# Patient Record
Sex: MALE | Race: BLACK OR AFRICAN AMERICAN | Employment: FULL TIME | URBAN - METROPOLITAN AREA
[De-identification: names, ages, dates, MRNs, and addresses within clinical notes are randomized per-mention and may not be internally consistent; named-entity substitution may affect disease eponyms.]

---
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
## 2021-04-10 ENCOUNTER — HOSPITAL ENCOUNTER (EMERGENCY)
Age: 21
Discharge: HOME | End: 2021-04-10
Attending: EMERGENCY MEDICINE

## 2021-04-10 VITALS
OXYGEN SATURATION: 100 % | SYSTOLIC BLOOD PRESSURE: 129 MMHG | TEMPERATURE: 98.8 F | DIASTOLIC BLOOD PRESSURE: 78 MMHG | RESPIRATION RATE: 16 BRPM | HEART RATE: 100 BPM

## 2021-04-10 DIAGNOSIS — J03.90 ACUTE TONSILLITIS, UNSPECIFIED ETIOLOGY: Primary | ICD-10-CM

## 2021-04-10 LAB — STREP,MOLECULAR STRPM: NEGATIVE

## 2021-04-10 PROCEDURE — 99283 EMERGENCY DEPT VISIT LOW MDM: CPT

## 2021-04-10 PROCEDURE — 87651 STREP A DNA AMP PROBE: CPT

## 2021-04-10 PROCEDURE — 74011250637 HC RX REV CODE- 250/637: Performed by: NURSE PRACTITIONER

## 2021-04-10 RX ORDER — DEXAMETHASONE SODIUM PHOSPHATE 100 MG/10ML
10 INJECTION INTRAMUSCULAR; INTRAVENOUS
Status: COMPLETED | OUTPATIENT
Start: 2021-04-10 | End: 2021-04-10

## 2021-04-10 RX ORDER — AMOXICILLIN 500 MG/1
500 TABLET, FILM COATED ORAL 2 TIMES DAILY
Qty: 20 TAB | Refills: 0 | Status: SHIPPED | OUTPATIENT
Start: 2021-04-10 | End: 2021-12-28

## 2021-04-10 RX ADMIN — DEXAMETHASONE SODIUM PHOSPHATE 10 MG: 10 INJECTION INTRAMUSCULAR; INTRAVENOUS at 17:54

## 2021-04-10 NOTE — ED TRIAGE NOTES
Pt ambulatory to triage. Pt states swelling to tonsils bilaterally. Pt has swelling, redness, and exudate noted to bilateral tonsils. No fever. No antipyretics in 24 hours. Strep swab not obtained- no swabs available at this time.

## 2021-04-10 NOTE — ED PROVIDER NOTES
Patient presents with sore throat and swelling to his tonsils for the past 3-4 days. He denies fever. The history is provided by the patient. Sore Throat   This is a new problem. The current episode started more than 2 days ago. The problem has been gradually worsening. There has been no fever. Associated symptoms include swollen glands. Pertinent negatives include no diarrhea, no vomiting, no congestion, no drooling, no ear discharge, no ear pain, no headaches, no plugged ear sensation, no shortness of breath, no stridor, no trouble swallowing, no stiff neck and no cough. No past medical history on file. No past surgical history on file. No family history on file.     Social History     Socioeconomic History    Marital status: SINGLE     Spouse name: Not on file    Number of children: Not on file    Years of education: Not on file    Highest education level: Not on file   Occupational History    Not on file   Social Needs    Financial resource strain: Not on file    Food insecurity     Worry: Not on file     Inability: Not on file    Transportation needs     Medical: Not on file     Non-medical: Not on file   Tobacco Use    Smoking status: Former Smoker     Packs/day: 0.25     Quit date: 2020     Years since quittin.6    Smokeless tobacco: Never Used    Tobacco comment: Patient stopped smoking 4 days ago   Substance and Sexual Activity    Alcohol use: Not Currently    Drug use: Never    Sexual activity: Not on file   Lifestyle    Physical activity     Days per week: Not on file     Minutes per session: Not on file    Stress: Not on file   Relationships    Social connections     Talks on phone: Not on file     Gets together: Not on file     Attends Alevism service: Not on file     Active member of club or organization: Not on file     Attends meetings of clubs or organizations: Not on file     Relationship status: Not on file    Intimate partner violence     Fear of current or ex partner: Not on file     Emotionally abused: Not on file     Physically abused: Not on file     Forced sexual activity: Not on file   Other Topics Concern    Not on file   Social History Narrative    Not on file         ALLERGIES: Patient has no known allergies. Review of Systems   Constitutional: Negative for fever. HENT: Positive for sore throat. Negative for congestion, drooling, ear discharge, ear pain and trouble swallowing. Respiratory: Negative for cough, shortness of breath and stridor. Gastrointestinal: Negative for diarrhea and vomiting. Neurological: Negative for headaches. Vitals:    04/10/21 1702   BP: 135/74   Pulse: 100   Resp: 16   Temp: 98.8 °F (37.1 °C)   SpO2: 98%            Physical Exam  Vitals signs and nursing note reviewed. Constitutional:       Appearance: He is well-developed. HENT:      Head: Normocephalic and atraumatic. Mouth/Throat:      Mouth: Mucous membranes are moist.      Pharynx: Pharyngeal swelling, oropharyngeal exudate and posterior oropharyngeal erythema present. Tonsils: Tonsillar exudate present. No tonsillar abscesses. Eyes:      Pupils: Pupils are equal, round, and reactive to light. Neck:      Musculoskeletal: Normal range of motion. Cardiovascular:      Rate and Rhythm: Normal rate. Pulses: Normal pulses. Pulmonary:      Effort: Pulmonary effort is normal.      Breath sounds: Normal breath sounds. Abdominal:      General: Abdomen is flat. There is no distension. Lymphadenopathy:      Head:      Right side of head: Tonsillar adenopathy present. Left side of head: Tonsillar adenopathy present. Cervical: Cervical adenopathy present. Skin:     General: Skin is warm and dry. Neurological:      General: No focal deficit present. Mental Status: He is alert and oriented to person, place, and time.    Psychiatric:         Mood and Affect: Mood normal.         Behavior: Behavior normal. Recent Results (from the past 12 hour(s))   STREP, GROUP A, LARON    Collection Time: 04/10/21  5:21 PM    Specimen: Swab   Result Value Ref Range    Strep, Molecular Negative         MDM  Number of Diagnoses or Management Options  Acute tonsillitis, unspecified etiology  Diagnosis management comments: Negative strep. Will go ahead and treat with antibiotics while culture is pending. DDX: Strep pharyngitis, PTA, viral pharyngitis.         Amount and/or Complexity of Data Reviewed  Clinical lab tests: ordered and reviewed  Tests in the medicine section of CPT®: ordered    Patient Progress  Patient progress: stable         Procedures

## 2021-04-10 NOTE — DISCHARGE INSTRUCTIONS
Amoxicillin as prescribed. You will need to change your toothbrush in 24 hours to prevent reinfection. Warm salt water gargles will help with pain. Follow up with your primary care provider for a recheck if symptoms worsen or fail to improve. Return to the emergency department as needed.

## 2021-04-10 NOTE — ED NOTES
I have reviewed discharge instructions with the patient. The patient verbalized understanding. Patient left ED via Discharge Method: ambulatory to Home with self  Opportunity for questions and clarification provided. Patient given 1 scripts. To continue your aftercare when you leave the hospital, you may receive an automated call from our care team to check in on how you are doing. This is a free service and part of our promise to provide the best care and service to meet your aftercare needs.  If you have questions, or wish to unsubscribe from this service please call [phone]. Thank you for Choosing our [facility] Emergency Department.

## 2022-01-05 ENCOUNTER — HOSPITAL ENCOUNTER (EMERGENCY)
Age: 22
Discharge: HOME | End: 2022-01-05
Attending: EMERGENCY MEDICINE

## 2022-01-05 VITALS
HEART RATE: 97 BPM | RESPIRATION RATE: 20 BRPM | HEIGHT: 73 IN | TEMPERATURE: 98.1 F | DIASTOLIC BLOOD PRESSURE: 100 MMHG | SYSTOLIC BLOOD PRESSURE: 157 MMHG | BODY MASS INDEX: 20.15 KG/M2 | OXYGEN SATURATION: 100 % | WEIGHT: 152 LBS

## 2022-01-05 DIAGNOSIS — B00.1 COLD SORE: Primary | ICD-10-CM

## 2022-01-05 DIAGNOSIS — U07.1 COVID-19: ICD-10-CM

## 2022-01-05 PROCEDURE — 86695 HERPES SIMPLEX TYPE 1 TEST: CPT

## 2022-01-05 PROCEDURE — 99281 EMR DPT VST MAYX REQ PHY/QHP: CPT

## 2022-01-05 RX ORDER — ACYCLOVIR 400 MG/1
400 TABLET ORAL 4 TIMES DAILY
Qty: 28 TABLET | Refills: 0 | Status: SHIPPED | OUTPATIENT
Start: 2022-01-05 | End: 2022-01-12

## 2022-01-05 NOTE — DISCHARGE INSTRUCTIONS
Use meds as directed, check my chart for test results will be called with any positive results.   Return to ER symptoms worsen

## 2022-01-05 NOTE — ED PROVIDER NOTES
Patient states he was diagnosed with Covid last week. Patient started to have tender rash to the roof of mouth and upper lip 2 days ago. No difficulty swallowing. Patient states he has some mild shortness of breath no cough or fever. Patient denies immunocompromise i.e. HIV, rheumatoid, cancer    The history is provided by the patient. Mouth Lesions  This is a new problem. The current episode started 2 days ago. The problem occurs constantly. The problem has been gradually worsening. Associated symptoms include shortness of breath. Pertinent negatives include no chest pain. Nothing aggravates the symptoms. Nothing relieves the symptoms. He has tried nothing for the symptoms. The treatment provided no relief. No past medical history on file. No past surgical history on file. No family history on file. Social History     Socioeconomic History    Marital status: SINGLE     Spouse name: Not on file    Number of children: Not on file    Years of education: Not on file    Highest education level: Not on file   Occupational History    Not on file   Tobacco Use    Smoking status: Former Smoker     Packs/day: 0.25     Quit date: 2020     Years since quittin.3    Smokeless tobacco: Never Used    Tobacco comment: Patient stopped smoking 4 days ago   Substance and Sexual Activity    Alcohol use: Not Currently    Drug use: Never    Sexual activity: Not on file   Other Topics Concern    Not on file   Social History Narrative    Not on file     Social Determinants of Health     Financial Resource Strain:     Difficulty of Paying Living Expenses: Not on file   Food Insecurity:     Worried About [address] in the Last Year: Not on file    [place] of Food in the Last Year: Not on file   Transportation Needs:     Lack of Transportation (Medical): Not on file    Lack of Transportation (Non-Medical):  Not on file   Physical Activity:     Days of Exercise per Week: Not on file  Minutes of Exercise per Session: Not on file   Stress:     Feeling of Stress : Not on file   Social Connections:     Frequency of Communication with Friends and Family: Not on file    Frequency of Social Gatherings with Friends and Family: Not on file    Attends [employer] Services: Not on file    Active Member of Clubs or Organizations: Not on file    Attends Club or Organization Meetings: Not on file    Marital Status: Not on file   Intimate Partner Violence:     Fear of Current or Ex-Partner: Not on file    Emotionally Abused: Not on file    Physically Abused: Not on file    Sexually Abused: Not on file   Housing Stability:     Unable to Pay for Housing in the Last Year: Not on file    Number of [place] in the Last Year: Not on file    Unstable Housing in the Last Year: Not on file         ALLERGIES: Patient has no known allergies. Review of Systems   Respiratory: Positive for shortness of breath. Cardiovascular: Negative for chest pain. All other systems reviewed and are negative. Vitals:    01/05/22 1217   BP: (!) 157/100   Pulse: 97   Resp: 20   Temp: 98.1 °F (36.7 °C)   SpO2: 100%   Weight: 68.9 kg (152 lb)   Height: 6' 1\" (1.854 m)            Physical Exam  Vitals and nursing note reviewed. Constitutional:       General: He is not in acute distress. Appearance: Normal appearance. He is well-developed and normal weight. He is not diaphoretic. HENT:      Head: Normocephalic and atraumatic. Right Ear: Tympanic membrane and external ear normal.      Left Ear: Tympanic membrane and external ear normal.      Nose: Nose normal.      Mouth/Throat:      Comments: Inner upper lip with small area of grouped crusted lesions tender to palpation. Roof of the mouth with similar small area of grouped lesions, posterior pharynx clear   Eyes:      Pupils: Pupils are equal, round, and reactive to light. Cardiovascular:      Rate and Rhythm: Normal rate and regular rhythm. Pulmonary:      Effort: Pulmonary effort is normal.      Breath sounds: Normal breath sounds. No wheezing or rhonchi. Chest:      Chest wall: No tenderness. Abdominal:      General: Bowel sounds are normal.      Palpations: Abdomen is soft. Musculoskeletal:         General: Normal range of motion. Cervical back: Normal range of motion and neck supple. Skin:     General: Skin is warm. Neurological:      General: No focal deficit present. Mental Status: He is alert and oriented to person, place, and time.    Psychiatric:         Mood and Affect: Mood normal.         Behavior: Behavior normal.          MDM  Number of Diagnoses or Management Options  Diagnosis management comments: Possible cold sore to mouth, will check hsv with blood sample  Will give rx for acyclovir   Pt still out of work due to covid        Amount and/or Complexity of Data Reviewed  Clinical lab tests: ordered  Review and summarize past medical records: yes    Risk of Complications, Morbidity, and/or Mortality  Presenting problems: low  Diagnostic procedures: low  Management options: low    Patient Progress  Patient progress: improved         Procedures

## 2022-01-06 LAB
HSV1 IGG SER IA-ACNC: 30.8 INDEX (ref 0–0.9)
HSV2 IGG SER IA-ACNC: <0.91 INDEX (ref 0–0.9)

## 2022-01-07 NOTE — PROGRESS NOTES
ED pharmacist successfully contacted Valerie Lyons on 01/07/22 regarding the recent results of their STI culture(s). The patient has been appropriately treated in the emergency department prior to discharge for the identified infection. The patient received a prescription for acyclovir upon discharge. Based on culture results, the patient is being adequately treated with existing antimicrobial therapy. The patient has been instructed to inform all anal, vaginal, and/or oral sex partners of the past 80 days since symptom onset of positive STI result(s). Valerie Lyons has also been instructed to abstain from sexual encounters until after receiving treatment and symptoms have resolved. Patient has been educated to practice safe sex by using a condom during any sexual encounters. The patient has been advised to follow-up with their primary care provider or return to the ED if symptoms do not resolve or worsen. Allergies as of 01/05/2022    (No Known Allergies)     Iris Carreon, PharmD.   Emergency Medicine Clinical Pharmacist

## 2022-07-07 ENCOUNTER — HOSPITAL ENCOUNTER (EMERGENCY)
Age: 22
Discharge: HOME | End: 2022-07-07
Attending: STUDENT IN AN ORGANIZED HEALTH CARE EDUCATION/TRAINING PROGRAM

## 2022-07-07 ENCOUNTER — APPOINTMENT (OUTPATIENT)
Dept: GENERAL RADIOLOGY | Age: 22
End: 2022-07-07

## 2022-07-07 VITALS
TEMPERATURE: 98.3 F | HEART RATE: 57 BPM | DIASTOLIC BLOOD PRESSURE: 79 MMHG | OXYGEN SATURATION: 99 % | WEIGHT: 156 LBS | BODY MASS INDEX: 20.67 KG/M2 | HEIGHT: 73 IN | SYSTOLIC BLOOD PRESSURE: 114 MMHG | RESPIRATION RATE: 17 BRPM

## 2022-07-07 DIAGNOSIS — K59.00 CONSTIPATION, UNSPECIFIED CONSTIPATION TYPE: Primary | ICD-10-CM

## 2022-07-07 LAB
ALBUMIN SERPL-MCNC: 4.1 G/DL (ref 3.5–5)
ALBUMIN/GLOB SERPL: 1.2 {RATIO} (ref 1.2–3.5)
ALP SERPL-CCNC: 81 U/L (ref 50–136)
ALT SERPL-CCNC: 26 U/L (ref 12–65)
ANION GAP SERPL CALC-SCNC: 2 MMOL/L (ref 7–16)
AST SERPL-CCNC: 19 U/L (ref 15–37)
BASOPHILS # BLD: 0.1 K/UL (ref 0–0.2)
BASOPHILS NFR BLD: 1 % (ref 0–2)
BILIRUB SERPL-MCNC: 1 MG/DL (ref 0.2–1.1)
BILIRUB UR QL: NEGATIVE
BUN SERPL-MCNC: 13 MG/DL (ref 6–23)
CALCIUM SERPL-MCNC: 8.9 MG/DL (ref 8.3–10.4)
CHLORIDE SERPL-SCNC: 106 MMOL/L (ref 98–107)
CO2 SERPL-SCNC: 30 MMOL/L (ref 21–32)
CREAT SERPL-MCNC: 1 MG/DL (ref 0.8–1.5)
DIFFERENTIAL METHOD BLD: ABNORMAL
EKG ATRIAL RATE: 66 BPM
EKG DIAGNOSIS: NORMAL
EKG P AXIS: 58 DEGREES
EKG P-R INTERVAL: 155 MS
EKG Q-T INTERVAL: 376 MS
EKG QRS DURATION: 97 MS
EKG QTC CALCULATION (BAZETT): 391 MS
EKG R AXIS: 92 DEGREES
EKG T AXIS: 66 DEGREES
EKG VENTRICULAR RATE: 65 BPM
EOSINOPHIL # BLD: 0.3 K/UL (ref 0–0.8)
EOSINOPHIL NFR BLD: 3 % (ref 0.5–7.8)
ERYTHROCYTE [DISTWIDTH] IN BLOOD BY AUTOMATED COUNT: 11.5 % (ref 11.9–14.6)
GLOBULIN SER CALC-MCNC: 3.4 G/DL (ref 2.3–3.5)
GLUCOSE SERPL-MCNC: 93 MG/DL (ref 65–100)
GLUCOSE UR QL STRIP.AUTO: NEGATIVE MG/DL
HCT VFR BLD AUTO: 46.7 % (ref 41.1–50.3)
HGB BLD-MCNC: 15 G/DL (ref 13.6–17.2)
IMM GRANULOCYTES # BLD AUTO: 0 K/UL (ref 0–0.5)
IMM GRANULOCYTES NFR BLD AUTO: 0 % (ref 0–5)
KETONES UR-MCNC: NEGATIVE MG/DL
LEUKOCYTE ESTERASE UR QL STRIP: NEGATIVE
LIPASE SERPL-CCNC: 58 U/L (ref 73–393)
LYMPHOCYTES # BLD: 1.9 K/UL (ref 0.5–4.6)
LYMPHOCYTES NFR BLD: 21 % (ref 13–44)
MCH RBC QN AUTO: 29.2 PG (ref 26.1–32.9)
MCHC RBC AUTO-ENTMCNC: 32.1 G/DL (ref 31.4–35)
MCV RBC AUTO: 90.9 FL (ref 79.6–97.8)
MONOCYTES # BLD: 0.8 K/UL (ref 0.1–1.3)
MONOCYTES NFR BLD: 9 % (ref 4–12)
NEUTS SEG # BLD: 5.9 K/UL (ref 1.7–8.2)
NEUTS SEG NFR BLD: 66 % (ref 43–78)
NITRITE UR QL: NEGATIVE
NRBC # BLD: 0 K/UL (ref 0–0.2)
PH UR: 7 [PH] (ref 5–9)
PLATELET # BLD AUTO: 256 K/UL (ref 150–450)
PMV BLD AUTO: 9.7 FL (ref 9.4–12.3)
POTASSIUM SERPL-SCNC: 3.9 MMOL/L (ref 3.5–5.1)
PROT SERPL-MCNC: 7.5 G/DL (ref 6.3–8.2)
PROT UR QL: NEGATIVE MG/DL
RBC # BLD AUTO: 5.14 M/UL (ref 4.23–5.6)
RBC # UR STRIP: NEGATIVE /UL
SERVICE CMNT-IMP: ABNORMAL
SODIUM SERPL-SCNC: 138 MMOL/L (ref 138–145)
SP GR UR: 1.02 (ref 1–1.02)
UROBILINOGEN UR QL: 1 EU/DL (ref 0.2–1)
WBC # BLD AUTO: 8.9 K/UL (ref 4.3–11.1)

## 2022-07-07 PROCEDURE — 74022 RADEX COMPL AQT ABD SERIES: CPT

## 2022-07-07 PROCEDURE — 80053 COMPREHEN METABOLIC PANEL: CPT

## 2022-07-07 PROCEDURE — 99285 EMERGENCY DEPT VISIT HI MDM: CPT

## 2022-07-07 PROCEDURE — 81003 URINALYSIS AUTO W/O SCOPE: CPT

## 2022-07-07 PROCEDURE — 85025 COMPLETE CBC W/AUTO DIFF WBC: CPT

## 2022-07-07 PROCEDURE — 83690 ASSAY OF LIPASE: CPT

## 2022-07-07 RX ORDER — POLYETHYLENE GLYCOL 3350, SODIUM CHLORIDE, SODIUM BICARBONATE, POTASSIUM CHLORIDE 420; 11.2; 5.72; 1.48 G/4L; G/4L; G/4L; G/4L
POWDER, FOR SOLUTION ORAL
Qty: 4000 ML | Refills: 0 | Status: SHIPPED | OUTPATIENT
Start: 2022-07-07

## 2022-07-07 ASSESSMENT — ENCOUNTER SYMPTOMS
SHORTNESS OF BREATH: 0
ABDOMINAL PAIN: 1
SINUS PAIN: 0
DIARRHEA: 0
BACK PAIN: 0
PHOTOPHOBIA: 0
NAUSEA: 0
CONSTIPATION: 1
VOMITING: 0

## 2022-07-07 ASSESSMENT — PAIN SCALES - GENERAL: PAINLEVEL_OUTOF10: 7

## 2022-07-07 NOTE — ED PROVIDER NOTES
[facility] Emergency Department Provider Note                   PCP:                None Provider               Age: 25 y.o. Sex: male       ICD-10-CM    1. Constipation, unspecified constipation type  K59.00        DISPOSITION Decision To Discharge 07/07/2022 08:58:56 AM        MDM  Number of Diagnoses or Management Options  Constipation, unspecified constipation type  Diagnosis management comments: Well-appearing 78-year-old male presents to the emergency room complaint of diffuse abdominal discomfort. Last BM 3 days ago. No relief with over-the-counter stool softeners. Urinalysis normal, lab work shows normal white count, stable H&H, normal electrolytes and kidney function, normal LFTs and lipase, X-ray without any emergent findings. There is increased stool throughout the abdomen, consistent with his history of constipation. Patient will try over-the-counter laxatives, was also prescribed GoLytely. Given strict return precautions. He voiced understanding and agreement with this plan. EKG shows sinus rhythm, rate 65, , QRS 97, QTc 391, normal axis, no significant ST elevation or depression.        Amount and/or Complexity of Data Reviewed  Clinical lab tests: ordered and reviewed  Tests in the radiology section of CPT®: ordered and reviewed  Independent visualization of images, tracings, or specimens: yes    Risk of Complications, Morbidity, and/or Mortality  Presenting problems: moderate  Diagnostic procedures: moderate  Management options: moderate         Orders Placed This Encounter   Procedures    XR ACUTE ABD SERIES CHEST 1 VW    CBC with Diff    CMP    Lipase    Diet NPO    POCT Urine Dipstick    POCT Urinalysis no Micro    EKG 12 Lead (Select if Upper Abd Pain, or SOB, Diaphoresis or Tachy)    Saline lock IV        Ayesha Miller is a 25 y.o. male who presents to the Emergency Department with chief complaint of    Chief Complaint   Patient presents with    Abdominal Pain 28-year-old male presents to the emergency part complaining of diffuse abdominal discomfort and constipation. Denies nausea or vomiting. Reports last vomit was 2 days ago. No relief with over-the-counter Dulcolax. Denies history of constipation or prior abdominal issues. Denies taking opiates or any other medications that could lead to his constipation. Denies any significant medical history. Denies melena or hematochezia. Addendum to include patient's history: No significant past medical history which was reviewed, former smoker          Review of Systems   Constitutional: Negative for chills and fever. HENT: Negative for congestion and sinus pain. Eyes: Negative for photophobia. Respiratory: Negative for shortness of breath. Cardiovascular: Negative for chest pain. Gastrointestinal: Positive for abdominal pain and constipation. Negative for diarrhea, nausea and vomiting. Genitourinary: Negative for difficulty urinating. Musculoskeletal: Negative for back pain. Skin: Negative for rash. Neurological: Negative for syncope and headaches. Psychiatric/Behavioral: Negative for confusion. All other systems reviewed and are negative. No past medical history on file. No past surgical history on file. No family history on file. Social Connections:     Frequency of Communication with Friends and Family: Not on file    Frequency of Social Gatherings with Friends and Family: Not on file    Attends Adventist Services: Not on file    Active Member of Clubs or Organizations: Not on file    Attends Club or Organization Meetings: Not on file    Marital Status: Not on file        No Known Allergies     Vitals signs and nursing note reviewed. Patient Vitals for the past 4 hrs:   Temp Pulse Resp BP SpO2   07/07/22 0730 98.3 °F (36.8 °C) 81 16 120/69 98 %          Physical Exam  Vitals and nursing note reviewed. Constitutional:       General: He is not in acute distress. grammatical and other typographical errors may be present. This note has not been completely proofread for errors.         Rema Pride, DO  07/07/22 Metsa 21, DO  09/10/22 [id number]

## 2022-07-07 NOTE — ED TRIAGE NOTES
Patient ambulatory to triage with mask in place. Patient reports upper abd pain x a few months. Pt reports some constipation with last BM 3 days ago. Pt reports he feels like he can't belch either. Pt reports some associated shob.

## 2022-07-07 NOTE — ED NOTES
I have reviewed discharge instructions with the patient. The patient verbalized understanding. Patient left ED via Discharge Method: ambulatory to Home with self. Opportunity for questions and clarification provided. Patient given 1 scripts. To continue your aftercare when you leave the hospital, you may receive an automated call from our care team to check in on how you are doing. This is a free service and part of our promise to provide the best care and service to meet your aftercare needs.  If you have questions, or wish to unsubscribe from this service please call [phone]. Thank you for Choosing our [facility] Emergency Department.       Rohan Cook RN  07/07/22 3433

## 2022-08-29 ENCOUNTER — OFFICE VISIT (OUTPATIENT)
Dept: OCCUPATIONAL MEDICINE | Age: 22
End: 2022-08-29

## 2022-08-29 VITALS
HEIGHT: 71 IN | SYSTOLIC BLOOD PRESSURE: 133 MMHG | OXYGEN SATURATION: 94 % | HEART RATE: 77 BPM | DIASTOLIC BLOOD PRESSURE: 75 MMHG | RESPIRATION RATE: 18 BRPM | TEMPERATURE: 98.1 F | WEIGHT: 154.2 LBS | BODY MASS INDEX: 21.59 KG/M2

## 2022-08-29 DIAGNOSIS — K59.00 CONSTIPATION, UNSPECIFIED CONSTIPATION TYPE: Primary | ICD-10-CM

## 2022-08-29 PROCEDURE — 99213 OFFICE O/P EST LOW 20 MIN: CPT | Performed by: NURSE PRACTITIONER

## 2022-08-29 ASSESSMENT — PATIENT HEALTH QUESTIONNAIRE - PHQ9
SUM OF ALL RESPONSES TO PHQ9 QUESTIONS 1 & 2: 0
SUM OF ALL RESPONSES TO PHQ QUESTIONS 1-9: 0
2. FEELING DOWN, DEPRESSED OR HOPELESS: 0
SUM OF ALL RESPONSES TO PHQ QUESTIONS 1-9: 0
1. LITTLE INTEREST OR PLEASURE IN DOING THINGS: 0

## 2022-08-29 NOTE — LETTER
[address] [place] [id number]  Phone: [phone]  Fax: [phone]    Florina Morris NP, APRN - CNP        August 29, 2022     Patient: Lexie Joel   YOB: 2000   Date of Visit: 8/29/2022       To Whom It May Concern: It is my medical opinion that Lexie Joel Please excuse from work on the following date: 8/29/2022. If you have any questions or concerns, please don't hesitate to call.     Sincerely,        Florina Morris NP, APRN - CNP

## 2022-08-29 NOTE — PROGRESS NOTES
HISTORY OF PRESENT ILLNESS  Sophia Zabala is a 25 y.o. male     Presenting today with abdominal pain. Diomedes reports having ongoing abdominal pain and bloating for the past 3 months. He reports going days without having a good, relieving bowel movement. Stool described as \"hard\" with significant straining. He went to the ER last month with the same complaints as today, dx with constipation. He was given a polyethylene glycol in the ER, had some decent success with having a BM following discharge. Was instructed to continue using PEG, but he never did. The bloating and pain has continued, last BM 2 days ago, not relieving. Pain is generalized. Has taking nothing for the constipation. He has an appt with his PCP tomorrow to address this same concern. Denies any fever, chills, N/V, hematochezia, or melena. Review of Systems   Constitutional:  Negative for chills, diaphoresis and fatigue. Respiratory:  Negative for cough and shortness of breath. Cardiovascular:  Negative for chest pain. Gastrointestinal:  Positive for abdominal pain and constipation. Negative for abdominal distention, anal bleeding, blood in stool, diarrhea, nausea, rectal pain and vomiting. Genitourinary:  Negative for difficulty urinating, dysuria, flank pain, frequency, hematuria and urgency. Musculoskeletal:  Negative for back pain. No past medical history on file.   Social History     Socioeconomic History    Marital status: Single     Spouse name: Not on file    Number of children: Not on file    Years of education: Not on file    Highest education level: Not on file   Occupational History    Not on file   Tobacco Use    Smoking status: Former     Packs/day: 0.25     Types: Cigarettes     Quit date: 2020     Years since quittin.0    Smokeless tobacco: Never    Tobacco comments:     Quit smoking: Patient stopped smoking 4 days ago   Substance and Sexual Activity    Alcohol use: Not Currently    Drug use: Never    Sexual activity: Not on file   Other Topics Concern    Not on file   Social History Narrative    Not on file     Social Determinants of Health     Financial Resource Strain: Not on file   Food Insecurity: Not on file   Transportation Needs: Not on file   Physical Activity: Not on file   Stress: Not on file   Social Connections: Not on file   Intimate Partner Violence: Not on file   Housing Stability: Not on file     No family history on file. No past surgical history on file. Immunization History   Administered Date(s) Administered    COVID-19, MODERNA BLUE border, Primary or Immunocompromised, (age 12y+), IM, 100 mcg/0.5mL 11/20/2021     Current Outpatient Medications   Medication Sig Dispense Refill    polyethylene glycol-electrolytes (NULYTELY) 420 g solution Take 1/2 bottle orally for constipation, if no bowel movement in 3 hours, consume the other half (Patient not taking: Reported on 8/29/2022) 4000 mL 0     No current facility-administered medications for this visit. No Known Allergies    Physical Exam  Vitals reviewed. Constitutional:       General: He is awake. He is not in acute distress. Appearance: Normal appearance. He is well-developed and well-groomed. He is not ill-appearing or toxic-appearing. HENT:      Head: Normocephalic and atraumatic. Cardiovascular:      Rate and Rhythm: Normal rate and regular rhythm. Pulses: Normal pulses. Heart sounds: Normal heart sounds, S1 normal and S2 normal.   Pulmonary:      Effort: Pulmonary effort is normal.      Breath sounds: Normal breath sounds and air entry. Abdominal:      General: Abdomen is flat. Bowel sounds are normal.      Palpations: Abdomen is soft. Tenderness: There is no abdominal tenderness. There is no right CVA tenderness, left CVA tenderness, guarding or rebound. Negative signs include Mathur's sign, Rovsing's sign and McBurney's sign. Skin:     General: Skin is warm and dry.       Capillary Refill: Capillary refill takes less than 2 seconds. Neurological:      General: No focal deficit present. Mental Status: He is alert and oriented to person, place, and time. Psychiatric:         Mood and Affect: Mood normal.         Behavior: Behavior normal. Behavior is cooperative. Thought Content: Thought content normal.        /75 (Site: Right Upper Arm, Position: Sitting, Cuff Size: Medium Adult)   Pulse 77   Temp 98.1 °F (36.7 °C) (Temporal)   Resp 18   Ht 5' 11\" (1.803 m)   Wt 154 lb 3.2 oz (69.9 kg)   SpO2 94%   BMI 21.51 kg/m²     ASSESSMENT and PLAN  Diomedes was seen today for abdominal pain. Diagnoses and all orders for this visit:    Constipation, unspecified constipation type    Start Miralax. Mix 17 g of powder (about a full scoop full) dissolved in 8 oz of water. Use this every hours, drinking a full glass every hour to get your bowels moving. Continue until stools are clear. After bowels are cleared, continue the Miralax just once a day. Start Colace/docusate sodium (stool softener) every single day. Increase water intake. Increase fiber in diet. Increase physical activity. Continue with follow up with PCP appt tomorrow. Follow up here at [address] [place].

## 2022-08-30 ASSESSMENT — ENCOUNTER SYMPTOMS
RECTAL PAIN: 0
ABDOMINAL DISTENTION: 0
DIARRHEA: 0
COUGH: 0
BACK PAIN: 0
ANAL BLEEDING: 0
ABDOMINAL PAIN: 1
SHORTNESS OF BREATH: 0
NAUSEA: 0
CONSTIPATION: 1
VOMITING: 0
BLOOD IN STOOL: 0

## 2022-11-01 ENCOUNTER — OFFICE VISIT (OUTPATIENT)
Dept: OCCUPATIONAL MEDICINE | Age: 22
End: 2022-11-01

## 2022-11-01 VITALS
OXYGEN SATURATION: 97 % | DIASTOLIC BLOOD PRESSURE: 76 MMHG | SYSTOLIC BLOOD PRESSURE: 113 MMHG | HEART RATE: 78 BPM | TEMPERATURE: 98.6 F

## 2022-11-01 DIAGNOSIS — K59.00 CONSTIPATION, UNSPECIFIED CONSTIPATION TYPE: ICD-10-CM

## 2022-11-01 DIAGNOSIS — J34.89 RHINORRHEA: Primary | ICD-10-CM

## 2022-11-01 DIAGNOSIS — R51.9 ACUTE NONINTRACTABLE HEADACHE, UNSPECIFIED HEADACHE TYPE: ICD-10-CM

## 2022-11-01 LAB
EXP DATE SOLUTION: NORMAL
EXP DATE SWAB: NORMAL
EXPIRATION DATE: NORMAL
INFLUENZA A ANTIGEN, POC: NEGATIVE
INFLUENZA B ANTIGEN, POC: NEGATIVE
LOT NUMBER POC: NORMAL
LOT NUMBER SOLUTION: NORMAL
LOT NUMBER SWAB: NORMAL
SARS-COV-2 RNA, POC: NEGATIVE
VALID INTERNAL CONTROL, POC: YES

## 2022-11-01 RX ORDER — DOCUSATE SODIUM 50 MG AND SENNOSIDES 8.6 MG 8.6; 5 MG/1; MG/1
TABLET, FILM COATED ORAL
COMMUNITY
Start: 2022-08-30 | End: 2022-11-01

## 2022-11-01 RX ORDER — AMOXICILLIN 250 MG
1 CAPSULE ORAL 4 TIMES DAILY PRN
COMMUNITY
Start: 2022-08-30

## 2022-11-01 RX ORDER — LACTULOSE 10 G/15ML
SOLUTION ORAL 2 TIMES DAILY PRN
COMMUNITY
Start: 2022-10-25

## 2022-11-01 ASSESSMENT — ENCOUNTER SYMPTOMS
SINUS PAIN: 0
ABDOMINAL PAIN: 1
EYE REDNESS: 0
CONSTIPATION: 1
EYE ITCHING: 0
BACK PAIN: 1
SINUS PRESSURE: 0
SORE THROAT: 0
NAUSEA: 0
EYE DISCHARGE: 0
RHINORRHEA: 1
DIARRHEA: 0
SHORTNESS OF BREATH: 1
WHEEZING: 0
EYE PAIN: 0
COUGH: 0
PHOTOPHOBIA: 0
VOMITING: 0
CHEST TIGHTNESS: 0

## 2022-11-01 ASSESSMENT — PATIENT HEALTH QUESTIONNAIRE - PHQ9
SUM OF ALL RESPONSES TO PHQ QUESTIONS 1-9: 0
1. LITTLE INTEREST OR PLEASURE IN DOING THINGS: 0
SUM OF ALL RESPONSES TO PHQ9 QUESTIONS 1 & 2: 0
SUM OF ALL RESPONSES TO PHQ QUESTIONS 1-9: 0
2. FEELING DOWN, DEPRESSED OR HOPELESS: 0

## 2022-11-01 NOTE — PROGRESS NOTES
PROGRESS NOTE    SUBJECTIVE     Shekhar Longo is a 25 y.o. male seen for:    Chief Complaint    Headache     . Patient states that he has had a headache for the past 2 days as well as a runny nose and post nasal drip. Patient took ibuprofen yesterday for his headache and found it to be helpful. Patient states his headache is still present now but is much improved today. Patient has been around his mother who has tested positive for influenza today as well as a coworker with influenza. Patient states he has had some abdominal pain and shortness of breath but he thinks this may be more related to constipation, for which he has struggled for some time and is consulting with GI. Patient states that the shortness of breath, which feels like he's running out of breath, will last for an hour or two and then resolve on its own with time. Patient states that he has not had an shortness of breath today. Patient plans to have a colonoscopy in November or December depending on when it can get scheduled. Patient is not sure when his last bowel movement was - he thinks it was on Sunday. Patient states he can go weeks without having a bowel movement and will get very uncomfortable and bloated. Current Outpatient Medications   Medication Sig Dispense Refill    senna-docusate (PERICOLACE) 8.6-50 MG per tablet Take 1 tablet by mouth 4 times daily as needed      lactulose (CHRONULAC) 10 GM/15ML solution Take by mouth 2 times daily as needed      polyethylene glycol-electrolytes (NULYTELY) 420 g solution Take 1/2 bottle orally for constipation, if no bowel movement in 3 hours, consume the other half 4000 mL 0     No current facility-administered medications for this visit. No Known Allergies  Past Medical History:   Diagnosis Date    Constipation      History reviewed. No pertinent surgical history.     Social History     Tobacco Use    Smoking status: Former     Packs/day: 0.25     Types: Cigarettes     Quit date: 8/22/2020 Years since quittin.1    Smokeless tobacco: Never    Tobacco comments:     Quit smoking: Patient stopped smoking 4 days ago   Vaping Use    Vaping Use: Former   Substance Use Topics    Alcohol use: Not Currently    Drug use: Never        History reviewed. No pertinent family history. Review of Systems   Constitutional:  Positive for fatigue. Negative for chills, diaphoresis and fever. HENT:  Positive for postnasal drip and rhinorrhea. Negative for congestion, ear pain, sinus pressure, sinus pain and sore throat. Eyes:  Negative for photophobia, pain, discharge, redness, itching and visual disturbance. Respiratory:  Positive for shortness of breath. Negative for cough, chest tightness and wheezing. Patient states that he will experience intermittent shortness of breath from his constipation and this is no different than usual for him (see HPI)   Cardiovascular:  Negative for chest pain and palpitations. Gastrointestinal:  Positive for abdominal pain and constipation. Negative for diarrhea, nausea and vomiting. Patient has a history of constipation causing abdominal pain (see HPI)   Musculoskeletal:  Positive for back pain. Negative for myalgias, neck pain and neck stiffness. Patient states that he will get back pain from time to time and believes it is associated with his constipation. Skin:  Negative for rash. Neurological:  Positive for headaches. Negative for dizziness, syncope, weakness and light-headedness. Psychiatric/Behavioral:  Negative for dysphoric mood and suicidal ideas. OBJECTIVE    /76 (Site: Right Upper Arm, Position: Sitting, Cuff Size: Medium Adult)   Pulse 78   Temp 98.6 °F (37 °C) (Temporal)   SpO2 97%    PHQ-9 Total Score: 0 (2022 12:43 PM)    Physical Exam  Vitals reviewed. Constitutional:       General: He is not in acute distress. Appearance: Normal appearance. He is not ill-appearing, toxic-appearing or diaphoretic. HENT:      Head: Normocephalic. Right Ear: Hearing, tympanic membrane, ear canal and external ear normal. No laceration, drainage, swelling or tenderness. No middle ear effusion. There is no impacted cerumen. No foreign body. No mastoid tenderness. No PE tube. No hemotympanum. Tympanic membrane is not injected, scarred, perforated, erythematous, retracted or bulging. Left Ear: Hearing, tympanic membrane, ear canal and external ear normal. No laceration, drainage, swelling or tenderness. No middle ear effusion. There is no impacted cerumen. No foreign body. No mastoid tenderness. No PE tube. No hemotympanum. Tympanic membrane is not injected, scarred, perforated, erythematous, retracted or bulging. Nose: Rhinorrhea present. No congestion. Right Nostril: No foreign body, epistaxis or occlusion. Left Nostril: No foreign body, epistaxis or occlusion. Right Turbinates: Not enlarged, swollen or pale. Left Turbinates: Not enlarged, swollen or pale. Right Sinus: No maxillary sinus tenderness or frontal sinus tenderness. Left Sinus: No maxillary sinus tenderness or frontal sinus tenderness. Mouth/Throat:      Lips: Pink. Mouth: Mucous membranes are moist.      Pharynx: Oropharynx is clear. Uvula midline. Posterior oropharyngeal erythema present. No pharyngeal swelling, oropharyngeal exudate or uvula swelling. Tonsils: No tonsillar exudate or tonsillar abscesses. 1+ on the right. 1+ on the left. Comments: Cobblestone and erythemic appearance of pharynx  Eyes:      General: No scleral icterus. Right eye: No discharge. Left eye: No discharge. Extraocular Movements: Extraocular movements intact. Conjunctiva/sclera: Conjunctivae normal.      Pupils: Pupils are equal, round, and reactive to light. Cardiovascular:      Rate and Rhythm: Normal rate and regular rhythm. Pulses: Normal pulses.            Radial pulses are 2+ on the right side and 2+ on the left side. Heart sounds: No murmur heard. No friction rub. No gallop. Pulmonary:      Effort: Pulmonary effort is normal. No respiratory distress. Breath sounds: Normal breath sounds. No stridor. No wheezing, rhonchi or rales. Abdominal:      General: Abdomen is flat. Bowel sounds are normal. There is no distension. Palpations: Abdomen is soft. There is no shifting dullness, fluid wave, hepatomegaly, splenomegaly, mass or pulsatile mass. Tenderness: There is abdominal tenderness in the left lower quadrant. There is no guarding or rebound. Negative signs include Mathur's sign and McBurney's sign. Hernia: No hernia is present. Musculoskeletal:         General: No swelling. Normal range of motion. Cervical back: Normal range of motion and neck supple. Lymphadenopathy:      Cervical: No cervical adenopathy. Skin:     General: Skin is warm. Capillary Refill: Capillary refill takes less than 2 seconds. Findings: No erythema or rash. Neurological:      General: No focal deficit present. Mental Status: He is alert and oriented to person, place, and time. Mental status is at baseline. Sensory: No sensory deficit. Motor: No weakness. Coordination: Coordination normal.      Gait: Gait normal.   Psychiatric:         Mood and Affect: Mood normal.         Behavior: Behavior normal.         Thought Content:  Thought content normal.         Judgment: Judgment normal.       Results for orders placed or performed in visit on 11/01/22   AMB POC COVID-19 COV   Result Value Ref Range    SARS-COV-2 RNA, POC Negative     Lot number swab      EXP date swab      Lot number solution      EXP date solution      LOT NUMBER POC      EXPIRATION DATE     AMB POC RAPID INFLUENZA TEST   Result Value Ref Range    Valid Internal Control, POC yes     Influenza A Antigen, POC Negative Negative    Influenza B Antigen, POC Negative Negative        Lab Results Component Value Date    WBC 8.9 07/07/2022    HGB 15.0 07/07/2022    HCT 46.7 07/07/2022    MCV 90.9 07/07/2022     07/07/2022       Lab Results   Component Value Date     07/07/2022    K 3.9 07/07/2022     07/07/2022    CO2 30 07/07/2022    BUN 13 07/07/2022    CREATININE 1.00 07/07/2022    GLUCOSE 93 07/07/2022    CALCIUM 8.9 07/07/2022    PROT 7.5 07/07/2022    LABALBU 4.1 07/07/2022    BILITOT 1.0 07/07/2022    ALKPHOS 81 07/07/2022    AST 19 07/07/2022    ALT 26 07/07/2022    LABGLOM >60 07/07/2022    GFRAA >60 07/07/2022    AGRATIO 1.2 08/26/2020    GLOB 3.4 07/07/2022           Lab Results   Component Value Date/Time     07/07/2022 07:47 AM    K 3.9 07/07/2022 07:47 AM     07/07/2022 07:47 AM    CO2 30 07/07/2022 07:47 AM    BUN 13 07/07/2022 07:47 AM    CREATININE 1.00 07/07/2022 07:47 AM    GLUCOSE 93 07/07/2022 07:47 AM    CALCIUM 8.9 07/07/2022 07:47 AM        No results found for: LABA1C    No results found for: CHOL  No results found for: TRIG  No results found for: HDL  No results found for: LDLCHOLESTEROL, LDLCALC  No results found for: LABVLDL, VLDL  No results found for: CHOLHDLRATIO    No results found for: TSHFT4, TSH, TSHREFLEX, BFQ3KSW    ASSESSMENT and PLAN    Diomedes was seen today for headache. Diagnoses and all orders for this visit:    Rhinorrhea  -     AMB POC COVID-19 COV  -     AMB POC RAPID INFLUENZA TEST    Acute nonintractable headache, unspecified headache type  -     AMB POC COVID-19 COV  -     AMB POC RAPID INFLUENZA TEST    Constipation, unspecified constipation type    Advised patient that he tested negative for COVID-19 and influenza A&B today but given his exposure to influenza it may be that he is just now developing symptoms. Explained that patient's symptoms may change over the course of this illness and he may experience more nasal congestion, cough, or sore throat as the days go on.     Advised patient to increase rest and fluids (work note provided for patient to be off work until 11/3/22). Advised ibuprofen or acetaminophen (take according to package instructions) for headache. Educated patient on using the neti pot, including boiling the water first and letting it cool to a warm temperature before performing the sinus rinse. When ready to perform sinus rinse, pour the warm water into the neti pot and add a saline packet to the water. Gentle swirl the water in the pot to distribute the contents of the saline packet. It is best to do neti pot in the morning and night and use the Flonase after doing a sinus rinse. Gargle with the leftover salt water from the neti pot. For the Flonase nasal spray, you have the option to use the spray once or twice a day - if once a day, spray 2 puffs into each nostril and if twice a day, spray 1 puff into each nostril. Spray with your head down and and try to spray within the nose as opposed to down the back of your throat. Flonase takes several days to a week to work so continue to use this daily to see the best effects. Can take Mucinex/Guaifenesin to help thin secretions and make it easier to cough up or remove secretions from the nose. Honey is also a natural alternative to help suppress a cough and has been shown to be just as effective as dextromethorphan (or \"DM\" type cough medicines). Honey has the added benefit of not interacting with certain medications that increase serotonin levels in the body, such as anti-depressants (SSRIs/SNRIs), anti-emetics (zofran), or migraine medications (triptans). Discussed patient's constipation symptoms - advised to increase Miralax slowly until a bowel movement is achieved every day or every other day and distension and discomfort is limited (max of 17gram dose per day). Can add psyllium husk supplement daily as well. Advised documenting food items eaten every day and its affects on the patient's GI system and bowel movements. Document all bowel movements as well. Can add other medications prescribed for constipation as needed but it may be helpful to discuss a better bowel regimen with the GI doctor. Reminded patient about the [employer] benefit offered through [employer], where employees (and their dependents) get free phone support, as well as referrals for in-person consultations with clinical, legal and financial professionals. To access these resources, call [phone], text HELLO to [phone], or visit [email or web address] (password: [email or web address]). Return to the clinic for persisting/worsening symptoms or new complaints that arise. Discussed signs and symptoms that would warrant immediate evaluation including, but not limited to severe headache, blurred vision, speech disturbance, difficulty with ambulation/gait, numbness, tingling, weakness, syncope, chest pain, or shortness of breath. Side effects and risk vs benefits associated with medications prescribed were discussed with patient who verbalized understanding. Pattent verbalized understanding and agreement with above plan of care. Counseled on benefits of having a primary care provider which includes, but is not limited to, continuity of care and having a medical home when concerns arise. Also enforced that onsite clinic policy states that we are not to take the place of a primary care provider. I have reviewed the patient's medication list, past medical, family, social, and surgical history in detail and updated the patient record appropriately.      ANGELICA Benedict NP

## 2022-11-09 ENCOUNTER — TELEPHONE (OUTPATIENT)
Dept: OCCUPATIONAL MEDICINE | Age: 22
End: 2022-11-09

## 2022-11-09 NOTE — TELEPHONE ENCOUNTER
Called patient to follow up on symptoms got a busy signal after attempting to call twice. Was not able to leave a message for patient to follow up with the [facility]. Patient's mychart is not set up.      ANGELICA Garner - NP